# Patient Record
Sex: FEMALE | Race: BLACK OR AFRICAN AMERICAN | NOT HISPANIC OR LATINO
[De-identification: names, ages, dates, MRNs, and addresses within clinical notes are randomized per-mention and may not be internally consistent; named-entity substitution may affect disease eponyms.]

---
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
## 2019-05-30 ENCOUNTER — APPOINTMENT (OUTPATIENT)
Dept: PEDIATRIC ADOLESCENT MEDICINE | Facility: CLINIC | Age: 19
End: 2019-05-30
Payer: COMMERCIAL

## 2019-05-30 VITALS — SYSTOLIC BLOOD PRESSURE: 117 MMHG | DIASTOLIC BLOOD PRESSURE: 73 MMHG | HEART RATE: 58 BPM

## 2019-05-30 VITALS — WEIGHT: 169 LBS

## 2019-05-30 PROCEDURE — 99213 OFFICE O/P EST LOW 20 MIN: CPT

## 2019-05-30 RX ORDER — DESOGESTREL AND ETHINYL ESTRADIOL 0.15-0.03
0.15-3 KIT ORAL DAILY
Qty: 4 | Refills: 3 | Status: DISCONTINUED | COMMUNITY
Start: 2018-08-10 | End: 2019-05-30

## 2019-05-30 RX ORDER — DESOGESTREL AND ETHINYL ESTRADIOL 0.15-0.03
0.15-3 KIT ORAL
Qty: 28 | Refills: 0 | Status: DISCONTINUED | COMMUNITY
Start: 2018-08-06 | End: 2019-05-30

## 2019-05-30 RX ORDER — DIPHENHYDRAMINE HCL 50 MG/1
50 CAPSULE ORAL
Qty: 30 | Refills: 0 | Status: ACTIVE | COMMUNITY
Start: 2019-05-28

## 2019-05-30 NOTE — RISK ASSESSMENT
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

## 2019-05-30 NOTE — HISTORY OF PRESENT ILLNESS
[FreeTextEntry6] : Patient is 18yo female seen for f/u of anaphylactic reaction likely to food at [place] on 5/27\par She initially had itching and hives and then had trouble breathing - felt throat closing and tongue swelling\par \par She called 911 and EMS and [facility] came - gave her an epipen (arrived within about 10 minutes) -she then received solumedrol, benadryl and epi at [facility] via EMS; had face swelling and chest tightness\par \par Only known allergy is Pen.\par Allergic reaction occurred approx 45 minutes after finishing dinner\par Dinner was bow tie pasta w/baby shrimp and diced tomatoes and lemon sauce w/no other foods\par frequently eats tomato sauce but not raw tomatoes\par Lunch - none\par Had makeup makeover at 3pm and episode was around 10pm\par \par Menstrual cramps improved on Mirena but does need Advil 600mg q6hr which works slowly (1-2 hours) and duration 4 hours\par \par she had waffles, eggs, turkey sausage, & shrimp that morning and on her birthday w/o problems and eats shrimp a lot\par Has epipen from [facility] ED along with prednisone 50mg x 4 days, benedryl prn and pepcid bid\par No further symptoms

## 2019-05-30 NOTE — DISCUSSION/SUMMARY
[FreeTextEntry1] : 20yo female seen for f/u of anaphylactic reaction\par Refer to A&I\par f/u PE in 8/19\par \par counseling regarding ongoing dysmenorrhea - in care with Dr Newton for same with plan to possibly use medication utilized for endometriosis or surgical correction of offending ligament

## 2019-07-17 ENCOUNTER — APPOINTMENT (OUTPATIENT)
Dept: PEDIATRIC ALLERGY IMMUNOLOGY | Facility: CLINIC | Age: 19
End: 2019-07-17
Payer: COMMERCIAL

## 2019-07-17 ENCOUNTER — LABORATORY RESULT (OUTPATIENT)
Age: 19
End: 2019-07-17

## 2019-07-17 VITALS
WEIGHT: 168.98 LBS | BODY MASS INDEX: 25.03 KG/M2 | HEART RATE: 72 BPM | HEIGHT: 69 IN | OXYGEN SATURATION: 99 % | DIASTOLIC BLOOD PRESSURE: 76 MMHG | SYSTOLIC BLOOD PRESSURE: 118 MMHG

## 2019-07-17 DIAGNOSIS — Z78.9 OTHER SPECIFIED HEALTH STATUS: ICD-10-CM

## 2019-07-17 DIAGNOSIS — T78.2XXA ANAPHYLACTIC SHOCK, UNSPECIFIED, INITIAL ENCOUNTER: ICD-10-CM

## 2019-07-17 DIAGNOSIS — T78.1XXA OTHER ADVERSE FOOD REACTIONS, NOT ELSEWHERE CLASSIFIED, INITIAL ENCOUNTER: ICD-10-CM

## 2019-07-17 DIAGNOSIS — H10.13 ACUTE ATOPIC CONJUNCTIVITIS, BILATERAL: ICD-10-CM

## 2019-07-17 DIAGNOSIS — J30.1 ALLERGIC RHINITIS DUE TO POLLEN: ICD-10-CM

## 2019-07-17 DIAGNOSIS — Z82.5 FAMILY HISTORY OF ASTHMA AND OTHER CHRONIC LOWER RESPIRATORY DISEASES: ICD-10-CM

## 2019-07-17 PROCEDURE — 95004 PERQ TESTS W/ALRGNC XTRCS: CPT

## 2019-07-17 PROCEDURE — 99204 OFFICE O/P NEW MOD 45 MIN: CPT | Mod: 25

## 2019-07-17 RX ORDER — PREDNISONE 50 MG/1
50 TABLET ORAL
Qty: 4 | Refills: 0 | Status: COMPLETED | COMMUNITY
Start: 2019-05-28 | End: 2019-07-17

## 2019-07-17 RX ORDER — OLOPATADINE HYDROCHLORIDE 1 MG/ML
SOLUTION/ DROPS OPHTHALMIC
Refills: 0 | Status: ACTIVE | COMMUNITY

## 2019-07-17 RX ORDER — FAMOTIDINE 20 MG/1
20 TABLET, FILM COATED ORAL
Qty: 14 | Refills: 0 | Status: COMPLETED | COMMUNITY
Start: 2019-05-28 | End: 2019-07-17

## 2019-07-23 PROBLEM — H10.13 ALLERGIC CONJUNCTIVITIS OF BOTH EYES: Status: ACTIVE | Noted: 2019-07-23

## 2019-07-24 PROBLEM — Z82.5 FAMILY HISTORY OF ASTHMA: Status: ACTIVE | Noted: 2019-07-17

## 2019-07-24 PROBLEM — T78.1XXA ADVERSE FOOD REACTION, INITIAL ENCOUNTER: Status: ACTIVE | Noted: 2019-07-24

## 2019-07-24 PROBLEM — Z78.9 NO SECONDHAND SMOKE EXPOSURE: Status: ACTIVE | Noted: 2019-07-24

## 2019-07-24 NOTE — REVIEW OF SYSTEMS
[Nl] : Integumentary [Immunizations are up to date] : Immunizations are up to date [Received Influenza Vaccine this Past Year] : patient has not received the Influenza vaccine this past year

## 2019-07-24 NOTE — SOCIAL HISTORY
[Mother] : mother [___ Brothers] : [unfilled] brothers [College] : College [Apartment] : [unfilled] lives in an apartment  [Radiator/Baseboard] : heating provided by radiator(s)/baseboard(s) [Window Units] : air conditioning provided by window units [Dust Mite Covers] : has dust mite covers [Feather Pillows] : has feather pillows [Feather Comforter] : has a feather comforter [Bedroom] :  in bedroom [Living Area] : in living area [Cat] : cat [de-identified] : School at [employer] [FreeTextEntry1] : [employer] in the fall [Humidifier] : does not use a humidifier [Dehumidifier] : does not use a dehumidifier [Cockroaches] : Patient states that there are no cockroaches in the home [Smokers in Household] : there are no smokers in the home [de-identified] : No rodents

## 2019-07-24 NOTE — HISTORY OF PRESENT ILLNESS
[Asthma] : asthma [Eczematous rashes] : eczematous rashes [Venom Reactions] : venom reactions [de-identified] : Patient is a 18 y/o female with no pertinent PMH who presents to the office for allergy evaluation s/p anaphylactic reaction in May 2019. \par \par The patient was eating at an Vietnamese restaurant with her friends in [facility] where she ate a bow tie pasta, shrimp, and tomato dish. About 30-45 minutes later, she was walking around and started to get hives on her skin. She then experienced a "hot flash." She called her mother who recommended she buy Benadryl from a local store. En route, she started to get lip and tongue swelling and called 911. The EMTs took her to [facility] and she was given epinephrine, Benadryl and steroids. She was monitored for several hours then discharged home with an additional EpiPen. \par \par She has had no further incidences or use of the EpiPen. She has been avoiding shrimp and other shellfish, bow tie pasta, and tomatoes since then. She reports eating those foods frequently beforehand (though not fresh tomatoes since childhood because she doesn't like them). Since the incident in May, she has had spaghetti, bread, and salmon with no allergic symptoms. \par \par She reports one prior incidence in 2004 where she was at an amusement park and experienced facial swelling after eating a meal. The symptoms resolved on their own and the patient and her mother were unable to identify the cause. The states she typically eats everything without restriction including milk, soy, peanuts, tree nuts, fish, shellfish, wheat, and oat.\par \par Otherwise, the patient is allergic to penicillin. In 2006, she developed hives, wheezing, and SOB after taking penicillin. She denies all other drugs allergies. \par \par Patient reports seasonal allergies in the spring. She experiences eye itching, nose itching/running, and sneezing. She uses Allegra and Patanol eyedrops PRN. \par \par Denies history of asthma or eczema.

## 2019-07-24 NOTE — REASON FOR VISIT
[Initial Consultation] : an initial consultation for [Anaphylaxis] : anaphylaxis [Allergy Evaluation/ Skin Testing] : allergy evaluation and or skin testing [Parent] : parent [Hay Fever] : hay fever [To Food] : allergy to food

## 2019-07-24 NOTE — PHYSICAL EXAM
[Alert] : alert [Well Nourished] : well nourished [Healthy Appearance] : healthy appearance [No Acute Distress] : no acute distress [Well Developed] : well developed [Normal Pupil & Iris Size/Symmetry] : normal pupil and iris size and symmetry [No Discharge] : no discharge [Sclera Not Icteric] : sclera not icteric [Normal TMs] : both tympanic membranes were normal [Normal Lips/Tongue] : the lips and tongue were normal [Normal Outer Ear/Nose] : the ears and nose were normal in appearance [No Nasal Discharge] : no nasal discharge [No Oral Lesions or Ulcers] : no oral lesions or ulcers [No Neck Mass] : no neck mass was observed [Normal Rate and Effort] : normal respiratory rhythm and effort [No Crackles] : no crackles [Bilateral Audible Breath Sounds] : bilateral audible breath sounds [Normal Rate] : heart rate was normal  [Regular Rhythm] : with a regular rhythm [No Rubs] : no pericardial rub [Soft] : abdomen soft [Not Tender] : non-tender [Not Distended] : not distended [Skin Intact] : skin intact  [No Rash] : no rash [No Skin Lesions] : no skin lesions [No Joint Swelling or Erythema] : no joint swelling or erythema [No clubbing] : no clubbing [No Edema] : no edema [No Cyanosis] : no cyanosis [Full ROM with no contractures] : full range of motion with no contractures [Conjunctival Erythema] : no conjunctival erythema [Suborbital Bogginess] : no suborbital bogginess (allergic shiners) [Boggy Nasal Turbinates] : no boggy and/or pale nasal turbinates [Pharyngeal erythema] : no pharyngeal erythema [Clear Rhinorrhea] : no clear rhinorrhea was seen [Wheezing] : no wheezing was heard [Eczematous Patches] : no eczematous patches [Xerosis] : no xerosis

## 2019-07-24 NOTE — CONSULT LETTER
[Dear  ___] : Dear  [unfilled], [Consult Letter:] : I had the pleasure of evaluating your patient, [unfilled]. [Consult Closing:] : Thank you very much for allowing me to participate in the care of this patient.  If you have any questions, please do not hesitate to contact me. [Please see my note below.] : Please see my note below. [Sincerely,] : Sincerely, [FreeTextEntry2] : LINDA KENT [FreeTextEntry3] : Madeline Bedoya MD\par Attending Physician, Division of Allergy/Immunology\par [facility]

## 2019-08-07 LAB
A ALTERNATA IGE QN: <0.1 KUA/L
A FUMIGATUS IGE QN: <0.1 KUA/L
AMER BEECH IGE QN: 0
BOXELDER IGE QN: <0.1 KUA/L
BRAZIL NUT IGE QN: 0.35 KUA/L
C HERBARUM IGE QN: <0.1 KUA/L
CAT DANDER IGE QN: <0.1 KUA/L
CMN PIGWEED IGE QN: <0.1 KUA/L
COCKSFOOT IGE QN: <0.1 KUA/L
COMMON RAGWEED IGE QN: <0.1 KUA/L
D FARINAE IGE QN: 0.46 KUA/L
D PTERONYSS IGE QN: 0.49 KUA/L
DEPRECATED A ALTERNATA IGE RAST QL: 0
DEPRECATED A FUMIGATUS IGE RAST QL: 0
DEPRECATED AMER BEECH IGE RAST QL: <0.1 KUA/L
DEPRECATED BOXELDER IGE RAST QL: 0
DEPRECATED BRAZIL NUT IGE RAST QL: 1
DEPRECATED C HERBARUM IGE RAST QL: 0
DEPRECATED CAT DANDER IGE RAST QL: 0
DEPRECATED COCKSFOOT IGE RAST QL: 0
DEPRECATED COMMON PIGWEED IGE RAST QL: 0
DEPRECATED COMMON RAGWEED IGE RAST QL: 0
DEPRECATED D FARINAE IGE RAST QL: 1
DEPRECATED D PTERONYSS IGE RAST QL: 1
DEPRECATED DOG DANDER IGE RAST QL: 0
DEPRECATED ENGL PLANTAIN IGE RAST QL: 0
DEPRECATED GIANT RAGWEED IGE RAST QL: 0
DEPRECATED GOOSE FEATHER IGE RAST QL: 0
DEPRECATED GOOSEFOOT IGE RAST QL: 0
DEPRECATED KENT BLUE GRASS IGE RAST QL: 0
DEPRECATED MUGWORT IGE RAST QL: 0
DEPRECATED P NOTATUM IGE RAST QL: 0
DEPRECATED RED TOP GRASS IGE RAST QL: 0
DEPRECATED ROACH IGE RAST QL: 3
DEPRECATED SILVER BIRCH IGE RAST QL: 0
DEPRECATED TIMOTHY IGE RAST QL: 0
DEPRECATED WHITE ASH IGE RAST QL: 0
DEPRECATED WHITE OAK IGE RAST QL: 0
DOG DANDER IGE QN: <0.1 KUA/L
ENGL PLANTAIN IGE QN: <0.1 KUA/L
GIANT RAGWEED IGE QN: <0.1 KUA/L
GOOSE FEATHER IGE QN: <0.1 KUA/L
GOOSEFOOT IGE QN: <0.1 KUA/L
KENT BLUE GRASS IGE QN: <0.1 KUA/L
MUGWORT IGE QN: <0.1 KUA/L
P NOTATUM IGE QN: <0.1 KUA/L
RED TOP GRASS IGE QN: <0.1 KUA/L
ROACH IGE QN: 11.9 KUA/L
SILVER BIRCH IGE QN: <0.1 KUA/L
TIMOTHY IGE QN: <0.1 KUA/L
WHITE ASH IGE QN: <0.1 KUA/L
WHITE ELM IGE QN: 0
WHITE ELM IGE QN: <0.1 KUA/L
WHITE OAK IGE QN: <0.1 KUA/L

## 2019-08-08 ENCOUNTER — APPOINTMENT (OUTPATIENT)
Dept: PEDIATRIC ADOLESCENT MEDICINE | Facility: CLINIC | Age: 19
End: 2019-08-08

## 2019-08-09 ENCOUNTER — APPOINTMENT (OUTPATIENT)
Dept: PEDIATRIC ADOLESCENT MEDICINE | Facility: CLINIC | Age: 19
End: 2019-08-09
Payer: SELF-PAY

## 2019-08-09 ENCOUNTER — OUTPATIENT (OUTPATIENT)
Dept: OUTPATIENT SERVICES | Age: 19
LOS: 1 days | End: 2019-08-09

## 2019-08-09 VITALS
SYSTOLIC BLOOD PRESSURE: 116 MMHG | HEART RATE: 71 BPM | WEIGHT: 164 LBS | HEIGHT: 69 IN | BODY MASS INDEX: 24.29 KG/M2 | DIASTOLIC BLOOD PRESSURE: 69 MMHG

## 2019-08-09 DIAGNOSIS — N94.6 DYSMENORRHEA, UNSPECIFIED: ICD-10-CM

## 2019-08-09 DIAGNOSIS — Z00.00 ENCOUNTER FOR GENERAL ADULT MEDICAL EXAMINATION W/OUT ABNORMAL FINDINGS: ICD-10-CM

## 2019-08-09 DIAGNOSIS — Z00.00 ENCOUNTER FOR GENERAL ADULT MEDICAL EXAMINATION WITHOUT ABNORMAL FINDINGS: ICD-10-CM

## 2019-08-09 PROCEDURE — 99395 PREV VISIT EST AGE 18-39: CPT

## 2019-08-09 NOTE — HISTORY OF PRESENT ILLNESS
[Mother] : mother [Yes] : Patient goes to dentist yearly [Up to date] : Up to date [At least 1 hour of physical activity a day] : does not do at least 1 hour of physical activity a day [Uses electronic nicotine delivery system] : does not use electronic nicotine delivery system [Uses tobacco] : does not use tobacco [Drinks alcohol] : does not drink alcohol [Uses drugs] : does not use drugs  [No] : No cigarette smoke exposure [Has problems with sleep] : does not have problems with sleep [Gets depressed, anxious, or irritable/has mood swings] : does not get depressed, anxious, or irritable/has mood swings [Has thought about hurting self or considered suicide] : has not thought about hurting self or considered suicide [FreeTextEntry7] : Seen by A&I and told + allergy to Brazil nut, cockroach and dust mite; Saw Dr Newton for dysmenorrhea and was given Orilissa (GnRH antagonist) to suppress ovulation started 7/25 - this month has had intermittent bleed w/severe pain despite 600mg Ibuprofen q6hr alternating w/tylenol - continuous OCP in past did not work [de-identified] : ongoing pain of dysmenorrhea despite Mirena; diagnosed w/bacterial vaginitis and given oral Clindamycine (?MRSA) and Diflucan for yeast vaginitis [FreeTextEntry8] : dysmenorrhea associated with nausea & headaches; as above irregular on MIrena for dysmenorrhea and now on Orilissa [de-identified] : due to go [de-identified] : as before [de-identified] : 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 [facility] [employer] Struggling with dysmenorrhea and being seen by Dr Newton as noted above

## 2019-08-09 NOTE — PHYSICAL EXAM
[Alert] : alert [No Acute Distress] : no acute distress [Normocephalic] : normocephalic [Clear tympanic membranes with bony landmarks and light reflex present bilaterally] : clear tympanic membranes with bony landmarks and light reflex present bilaterally  [EOMI Bilateral] : EOMI bilateral [Nonerythematous Oropharynx] : nonerythematous oropharynx [Pink Nasal Mucosa] : pink nasal mucosa [Supple, full passive range of motion] : supple, full passive range of motion [No Palpable Masses] : no palpable masses [Clear to Ausculatation Bilaterally] : clear to auscultation bilaterally [Regular Rate and Rhythm] : regular rate and rhythm [Normal S1, S2 audible] : normal S1, S2 audible [+2 Femoral Pulses] : +2 femoral pulses [No Murmurs] : no murmurs [Soft] : soft [NonTender] : non tender [Non Distended] : non distended [Normoactive Bowel Sounds] : normoactive bowel sounds [No Splenomegaly] : no splenomegaly [No Hepatomegaly] : no hepatomegaly [No Masses] : no masses [Merrill: ____] : Merrill [unfilled] [Merrill: _____] : Merrill [unfilled] [Normal External Genitalia] : normal external genitalia [No Abnormal Lymph Nodes Palpated] : no abnormal lymph nodes palpated [Normal Muscle Tone] : normal muscle tone [No pain or deformities with palpation of bone, muscles, joints] : no pain or deformities with palpation of bone, muscles, joints [No Gait Asymmetry] : no gait asymmetry [Straight] : straight [+2 Patella DTR] : +2 patella DTR [Cranial Nerves Grossly Intact] : cranial nerves grossly intact [No Rash or Lesions] : no rash or lesions [de-identified] : WNL exam

## 2019-12-12 ENCOUNTER — APPOINTMENT (OUTPATIENT)
Dept: PEDIATRIC ALLERGY IMMUNOLOGY | Facility: CLINIC | Age: 19
End: 2019-12-12
Payer: COMMERCIAL

## 2019-12-12 ENCOUNTER — APPOINTMENT (OUTPATIENT)
Dept: PEDIATRIC ADOLESCENT MEDICINE | Facility: CLINIC | Age: 19
End: 2019-12-12
Payer: COMMERCIAL

## 2019-12-12 ENCOUNTER — LABORATORY RESULT (OUTPATIENT)
Age: 19
End: 2019-12-12

## 2019-12-12 VITALS
HEART RATE: 96 BPM | WEIGHT: 171 LBS | HEIGHT: 69 IN | DIASTOLIC BLOOD PRESSURE: 77 MMHG | BODY MASS INDEX: 25.33 KG/M2 | SYSTOLIC BLOOD PRESSURE: 119 MMHG

## 2019-12-12 VITALS — OXYGEN SATURATION: 100 % | HEART RATE: 66 BPM | SYSTOLIC BLOOD PRESSURE: 106 MMHG | DIASTOLIC BLOOD PRESSURE: 66 MMHG

## 2019-12-12 DIAGNOSIS — Z88.9 ALLERGY STATUS TO UNSPECIFIED DRUGS, MEDICAMENTS AND BIOLOGICAL SUBSTANCES: ICD-10-CM

## 2019-12-12 DIAGNOSIS — J30.89 OTHER ALLERGIC RHINITIS: ICD-10-CM

## 2019-12-12 DIAGNOSIS — Z91.018 ALLERGY TO OTHER FOODS: ICD-10-CM

## 2019-12-12 DIAGNOSIS — Z88.0 ALLERGY STATUS TO PENICILLIN: ICD-10-CM

## 2019-12-12 DIAGNOSIS — L50.8 OTHER URTICARIA: ICD-10-CM

## 2019-12-12 DIAGNOSIS — T78.3XXA ANGIONEUROTIC EDEMA, INITIAL ENCOUNTER: ICD-10-CM

## 2019-12-12 DIAGNOSIS — Z23 ENCOUNTER FOR IMMUNIZATION: ICD-10-CM

## 2019-12-12 DIAGNOSIS — Z30.013 ENCOUNTER FOR INITIAL PRESCRIPTION OF INJECTABLE CONTRACEPTIVE: ICD-10-CM

## 2019-12-12 DIAGNOSIS — T78.2XXD ANAPHYLACTIC SHOCK, UNSPECIFIED, SUBSEQUENT ENCOUNTER: ICD-10-CM

## 2019-12-12 DIAGNOSIS — J45.998 OTHER ASTHMA: ICD-10-CM

## 2019-12-12 PROCEDURE — 95004 PERQ TESTS W/ALRGNC XTRCS: CPT

## 2019-12-12 PROCEDURE — 90471 IMMUNIZATION ADMIN: CPT

## 2019-12-12 PROCEDURE — 96372 THER/PROPH/DIAG INJ SC/IM: CPT

## 2019-12-12 PROCEDURE — 90674 CCIIV4 VAC NO PRSV 0.5 ML IM: CPT

## 2019-12-12 PROCEDURE — 99213 OFFICE O/P EST LOW 20 MIN: CPT | Mod: 25

## 2019-12-12 PROCEDURE — 99214 OFFICE O/P EST MOD 30 MIN: CPT | Mod: 25

## 2019-12-12 RX ORDER — LEVONORGESTREL 52 MG/1
20 INTRAUTERINE DEVICE INTRAUTERINE
Refills: 0 | Status: DISCONTINUED | COMMUNITY
End: 2019-12-12

## 2019-12-12 RX ORDER — FLUCONAZOLE 150 MG/1
150 TABLET ORAL
Qty: 1 | Refills: 0 | Status: COMPLETED | COMMUNITY
Start: 2019-09-16

## 2019-12-12 RX ORDER — DIPHENHYDRAMINE HYDROCHLORIDE 25 MG/1
25 CAPSULE ORAL
Qty: 30 | Refills: 0 | Status: ACTIVE | COMMUNITY
Start: 2019-07-17

## 2019-12-12 RX ORDER — CLINDAMYCIN HYDROCHLORIDE 150 MG/1
150 CAPSULE ORAL
Qty: 40 | Refills: 0 | Status: COMPLETED | COMMUNITY
Start: 2019-07-25

## 2019-12-12 RX ORDER — FEXOFENADINE HCL 180 MG
180 TABLET ORAL
Refills: 0 | Status: DISCONTINUED | COMMUNITY
End: 2019-12-12

## 2019-12-12 RX ORDER — SULFAMETHOXAZOLE AND TRIMETHOPRIM 800; 160 MG/1; MG/1
800-160 TABLET ORAL
Qty: 14 | Refills: 0 | Status: COMPLETED | COMMUNITY
Start: 2019-11-26

## 2019-12-12 RX ORDER — FLUTICASONE PROPIONATE 50 UG/1
50 SPRAY, METERED NASAL DAILY
Qty: 1 | Refills: 5 | Status: ACTIVE | COMMUNITY
Start: 2019-07-17 | End: 1900-01-01

## 2019-12-12 RX ORDER — ELAGOLIX 150 MG/1
150 TABLET, FILM COATED ORAL
Refills: 0 | Status: DISCONTINUED | COMMUNITY
End: 2019-08-22

## 2019-12-12 RX ORDER — EPINEPHRINE 0.3 MG/.3ML
0.3 INJECTION INTRAMUSCULAR
Qty: 2 | Refills: 0 | Status: ACTIVE | COMMUNITY
Start: 2019-05-28 | End: 1900-01-01

## 2019-12-12 RX ADMIN — MEDROXYPROGESTERONE ACETATE 0 MG/ML: 150 INJECTION, SUSPENSION, EXTENDED RELEASE INTRAMUSCULAR at 00:00

## 2019-12-13 RX ORDER — MEDROXYPROGESTERONE ACETATE 150 MG/ML
150 INJECTION, SUSPENSION INTRAMUSCULAR
Qty: 0 | Refills: 0 | Status: COMPLETED | OUTPATIENT
Start: 2019-12-12

## 2019-12-13 RX ORDER — IBUPROFEN 600 MG/1
600 TABLET, FILM COATED ORAL
Qty: 60 | Refills: 3 | Status: ACTIVE | COMMUNITY
Start: 2019-07-22 | End: 1900-01-01

## 2019-12-13 NOTE — PHYSICAL EXAM
[Cerumen in canal] : cerumen in canal [Nonerythematous Oropharynx] : nonerythematous oropharynx [NL] : warm [de-identified] : cobblestoning of adenoids [FreeTextEntry7] : no wheezes/rales/rhonchi [de-identified] : no eczema, no rash

## 2019-12-13 NOTE — DISCUSSION/SUMMARY
[] : The components of the vaccine(s) to be administered today are listed in the plan of care. The disease(s) for which the vaccine(s) are intended to prevent and the risks have been discussed with the caretaker.  The risks are also included in the appropriate vaccination information statements which have been provided to the patient's caregiver.  The caregiver has given consent to vaccinate. [FreeTextEntry1] : Patient is 20 y/o female seen for transition from mirena to depo provera and persistent cough w/"fits" and post tussive emesis x 2 in past week.\par Upreg neg, administered depoprovera 150mg IM R arm in office.\par Coughing fits likely 2/2 post-viral cough, will likely improve with rest. May use throat lozenges, ibuprofen or tylenol. Unlikely to be any underlying RAD given clear lungs. Instructed to not use family member's albuterol as it will likely not help\par If coughing fits persist despite starting flonase, will consider steroid inhaler for post viral RAD\par Administered influenza vaccine L arm\par also given 1 pack of Mononessa to bring to school in case bleeding is an issue on depo provera\par

## 2019-12-13 NOTE — HISTORY OF PRESENT ILLNESS
[FreeTextEntry6] : Patient is 20yo female seen for transition from mirena to depo provera and persistent cough w/"fits" and post tussive emesis x 2 in past week\par She just received flonase from A&I for allergies and learned of her allergy to shellfish\par also changed allegra to zyrtec due to insurance\par \par Developed throat pain and chest tightness 11/23. Chest tightness improved over the course of a week but the throat pain continued. Then developed dysuria on 11/24, so went to ED in [place] ([facility]) on 11/25 and was dx with a UTI. Gave Bactrim and Diflucan for previous diagnosis of bacterial vaginitis in july (took Clindamycin and Diflucan at that time). The cough then started 11/29-30. +sick contacts. In the past two weeks, she has coughing fits with gagging and posttussive emesis x2. Yesterday the emesis had spots of blood in it. ROS+ erythematous papular rash this AM. No lip or throat swelling. ROS neg for fever, diarrea, nasal congestion, running. Used family member's albuterol neb 3 days ago.\par Has wheezed infrequently in the past associated with URIs or allergies, never dx w/ RAD. Multiple food and drug allergies (amoxicillin, seafood, brazil nuts, etc.) w/ hx anaphylaxis to penicillin. No eczema. FH+ brother and grandparents w/ asthma.\par Hx strep throat in the past.

## 2019-12-13 NOTE — RISK ASSESSMENT
[Eats meals with family] : eats meals with family [Has family members/adults to turn to for help] : has family members/adults to turn to for help [Normal Behavior/Attention] : normal behavior/attention [Normal Performance] : normal performance [Is permitted and is able to make independent decisions] : Is permitted and is able to make independent decisions [Drinks non-sweetened liquids] : drinks non-sweetened liquids  [Eats regular meals including adequate fruits and vegetables] : eats regular meals including adequate fruits and vegetables [Normal Homework] : normal homework [Calcium source] : calcium source [Has friends] : has friends [Uses safety belts/safety equipment] : uses safety belts/safety equipment  [Home is free of violence] : home is free of violence [Displays self-confidence] : displays self-confidence [Has peer relationships free of violence] : has peer relationships free of violence [Has ways to cope with stress] : has ways to cope with stress [With Teen] : teen [Has concerns about body or appearance] : does not have concerns about body or appearance [Uses tobacco] : does not use tobacco [Uses drugs] : does not use drugs  [Drinks alcohol] : does not drink alcohol [Impaired/distracted driving] : no impaired/distracted driving [Has problems with sleep] : does not have problems with sleep [Gets depressed, anxious, or irritable/has mood swings] : does not get depressed, anxious, or irritable/has mood swings [Has thought about hurting self or considered suicide] : has not thought about hurting self or considered suicide

## 2019-12-13 NOTE — REVIEW OF SYSTEMS
[Sore Throat] : sore throat [Cough] : cough [Vomiting] : vomiting [Rash] : rash [Negative] : Genitourinary [Fever] : no fever [Chills] : no chills [Difficulty with Sleep] : no difficulty with sleep [Headache] : no headache [Nasal Congestion] : no nasal congestion [Snoring] : no snoring [Nasal Discharge] : no nasal discharge [Diarrhea] : no diarrhea [Congestion] : no congestion

## 2019-12-17 RX ORDER — POLYETHYLENE GLYCOL 3350 17 G/17G
17 POWDER, FOR SOLUTION ORAL
Qty: 1 | Refills: 3 | Status: ACTIVE | COMMUNITY
Start: 2019-12-17 | End: 1900-01-01

## 2019-12-17 RX ORDER — FLUTICASONE PROPIONATE 110 UG/1
110 AEROSOL, METERED RESPIRATORY (INHALATION) TWICE DAILY
Qty: 1 | Refills: 3 | Status: ACTIVE | COMMUNITY
Start: 2019-12-17 | End: 1900-01-01

## 2019-12-19 PROBLEM — L50.8 CHRONIC URTICARIA: Status: ACTIVE | Noted: 2019-12-19

## 2019-12-19 PROBLEM — T78.3XXA ANGIOEDEMA, INITIAL ENCOUNTER: Status: ACTIVE | Noted: 2019-12-19

## 2019-12-19 PROBLEM — J30.89 PERENNIAL ALLERGIC RHINITIS: Status: ACTIVE | Noted: 2019-12-19

## 2019-12-19 PROBLEM — Z88.9 DRUG ALLERGY: Status: ACTIVE | Noted: 2019-07-24

## 2019-12-19 PROBLEM — T78.2XXD ANAPHYLAXIS, SUBSEQUENT ENCOUNTER: Status: ACTIVE | Noted: 2019-05-30

## 2019-12-19 PROBLEM — Z88.0 PENICILLIN ALLERGY: Status: ACTIVE | Noted: 2019-07-24

## 2019-12-19 LAB
BLUE MUSSEL IGE QN: <0.1 KUA/L
CLAM IGE QN: <0.1 KUA/L
CRAB IGE QN: 1.68 KUA/L
DEPRECATED BLUE MUSSEL IGE RAST QL: 0
DEPRECATED CLAM IGE RAST QL: 0
DEPRECATED CRAB IGE RAST QL: 2
DEPRECATED LOBSTER IGE RAST QL: 1
DEPRECATED OYSTER IGE RAST QL: 0
DEPRECATED SCALLOP IGE RAST QL: <0.1 KUA/L
DEPRECATED SHRIMP IGE RAST QL: 1
LOBSTER IGE QN: 0.5 KUA/L
OYSTER IGE QN: <0.1 KUA/L
SCALLOP IGE QN: 0
SCALLOP IGE QN: 0.56 KUA/L

## 2019-12-19 RX ORDER — CETIRIZINE HYDROCHLORIDE 10 MG/1
10 TABLET, COATED ORAL
Qty: 1 | Refills: 5 | Status: ACTIVE | COMMUNITY
Start: 2019-12-12

## 2019-12-19 RX ORDER — CAMPHOR 0.45 %
25 GEL (GRAM) TOPICAL
Qty: 60 | Refills: 5 | Status: ACTIVE | COMMUNITY
Start: 2019-07-17

## 2019-12-19 RX ORDER — KETOTIFEN FUMARATE 0.25 MG/ML
0.03 SOLUTION/ DROPS OPHTHALMIC
Qty: 1 | Refills: 3 | Status: ACTIVE | COMMUNITY
Start: 2019-07-23

## 2019-12-19 NOTE — PHYSICAL EXAM
[Alert] : alert [Well Nourished] : well nourished [Healthy Appearance] : healthy appearance [No Acute Distress] : no acute distress [Well Developed] : well developed [Normal Pupil & Iris Size/Symmetry] : normal pupil and iris size and symmetry [No Discharge] : no discharge [No Photophobia] : no photophobia [Sclera Not Icteric] : sclera not icteric [Normal TMs] : both tympanic membranes were normal [Normal Nasal Mucosa] : the nasal mucosa was normal [Normal Lips/Tongue] : the lips and tongue were normal [Normal Outer Ear/Nose] : the ears and nose were normal in appearance [Normal Tonsils] : normal tonsils [No Thrush] : no thrush [Normal Dentition] : normal dentition [No Oral Lesions or Ulcers] : no oral lesions or ulcers [Boggy Nasal Turbinates] : boggy and/or pale nasal turbinates [Posterior Pharyngeal Cobblestoning] : posterior pharyngeal cobblestoning [Supple] : the neck was supple [Normal Rate and Effort] : normal respiratory rhythm and effort [Normal Palpation] : palpation of the chest revealed no abnormalities [No Crackles] : no crackles [No Retractions] : no retractions [Bilateral Audible Breath Sounds] : bilateral audible breath sounds [Normal Rate] : heart rate was normal  [Normal S1, S2] : normal S1 and S2 [Regular Rhythm] : with a regular rhythm [Soft] : abdomen soft [Not Tender] : non-tender [Not Distended] : not distended [No HSM] : no hepato-splenomegaly [Normal Cervical Lymph Nodes] : cervical [Normal Axillary Lumph Nodes] : axillary [Skin Intact] : skin intact  [No Rash] : no rash [No Skin Lesions] : no skin lesions [No Joint Swelling or Erythema] : no joint swelling or erythema [No clubbing] : no clubbing [No Edema] : no edema [No Cyanosis] : no cyanosis [Normal Mood] : mood was normal [Normal Affect] : affect was normal [Alert, Awake, Oriented as Age-Appropriate] : alert, awake, oriented as age appropriate

## 2019-12-19 NOTE — IMPRESSION
[Allergy Testing Dust Mite] : dust mites [Allergy Testing Mixed Feathers] : feathers [Allergy Testing Cockroach] : cockroach [Allergy Testing Dog] : dog [Allergy Testing Cat] : cat [Allergy Testing Trees] : trees [Allergy Testing Weeds] : weeds [Allergy Testing Grasses] : grasses [] : shellfish

## 2019-12-19 NOTE — REASON FOR VISIT
[Routine Follow-Up] : a routine follow-up visit for [Anaphylaxis] : anaphylaxis [To Food] : allergy to food [FreeTextEntry2] : allergic rhinitis

## 2019-12-19 NOTE — HISTORY OF PRESENT ILLNESS
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 \par Chronic urticaria/angioedema: Patient states that she had 2 episodes of lip itching, once in October where she prepared shrimp and crabmeat, ate it and about 20 min later developed lip itching, took Benadryl 50 mg and symptoms resolved. then again had shrimp in November and developed lip itching. She eats shellfish a lot, but reacts to it sometimes, she is not sure whether this is due to where she buys the shellfish. Patient takes antihistamines. \par Drug allergy: allergic to penicillin, after receiving 2nd dose of amoxicillin for either ear or throat infection Kathi developed trouble breathing and hives, has been avoiding penicillin antibiotics since then. 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 Had positive blood and skin testing. \par Allergic rhinitis: allergic to dust mites, mold and pacheco, no issues at this time.

## 2020-09-09 DIAGNOSIS — N94.6 DYSMENORRHEA, UNSPECIFIED: ICD-10-CM

## 2020-09-14 RX ORDER — MEDROXYPROGESTERONE ACETATE 150 MG/ML
150 INJECTION, SUSPENSION INTRAMUSCULAR
Qty: 1 | Refills: 3 | Status: ACTIVE | COMMUNITY
Start: 2020-09-09 | End: 1900-01-01